# Patient Record
Sex: FEMALE | Race: WHITE | NOT HISPANIC OR LATINO | Employment: OTHER | URBAN - METROPOLITAN AREA
[De-identification: names, ages, dates, MRNs, and addresses within clinical notes are randomized per-mention and may not be internally consistent; named-entity substitution may affect disease eponyms.]

---

## 2020-06-09 ENCOUNTER — AMBULATORY - HEALTHEAST (OUTPATIENT)
Dept: SURGERY | Facility: CLINIC | Age: 75
End: 2020-06-09

## 2020-06-09 DIAGNOSIS — Z11.59 ENCOUNTER FOR SCREENING FOR OTHER VIRAL DISEASES: ICD-10-CM

## 2020-07-07 ENCOUNTER — AMBULATORY - HEALTHEAST (OUTPATIENT)
Dept: MULTI SPECIALTY CLINIC | Facility: CLINIC | Age: 75
End: 2020-07-07

## 2020-07-07 LAB
CREAT SERPL-MCNC: 0.88 MG/DL (ref 0.55–1.02)
GFR ESTIMATE EXT - HISTORICAL: >60 ML/MIN/1.73M2

## 2020-07-13 ENCOUNTER — AMBULATORY - HEALTHEAST (OUTPATIENT)
Dept: SURGERY | Facility: CLINIC | Age: 75
End: 2020-07-13

## 2020-07-13 DIAGNOSIS — Z11.59 ENCOUNTER FOR SCREENING FOR OTHER VIRAL DISEASES: ICD-10-CM

## 2020-08-21 ASSESSMENT — MIFFLIN-ST. JEOR: SCORE: 1513.27

## 2020-08-22 ENCOUNTER — AMBULATORY - HEALTHEAST (OUTPATIENT)
Dept: FAMILY MEDICINE | Facility: CLINIC | Age: 75
End: 2020-08-22

## 2020-08-22 DIAGNOSIS — Z11.59 ENCOUNTER FOR SCREENING FOR OTHER VIRAL DISEASES: ICD-10-CM

## 2020-08-23 LAB
SARS-COV-2 PCR COMMENT: NORMAL
SARS-COV-2 RNA SPEC QL NAA+PROBE: NEGATIVE
SARS-COV-2 VIRUS SPECIMEN SOURCE: NORMAL

## 2020-08-24 ENCOUNTER — COMMUNICATION - HEALTHEAST (OUTPATIENT)
Dept: SCHEDULING | Facility: CLINIC | Age: 75
End: 2020-08-24

## 2020-08-25 ENCOUNTER — ANESTHESIA - HEALTHEAST (OUTPATIENT)
Dept: SURGERY | Facility: CLINIC | Age: 75
End: 2020-08-25

## 2020-08-26 ENCOUNTER — SURGERY - HEALTHEAST (OUTPATIENT)
Dept: SURGERY | Facility: CLINIC | Age: 75
End: 2020-08-26

## 2020-08-26 ASSESSMENT — MIFFLIN-ST. JEOR: SCORE: 1445.69

## 2020-08-27 ASSESSMENT — MIFFLIN-ST. JEOR: SCORE: 1497.86

## 2020-08-28 ASSESSMENT — MIFFLIN-ST. JEOR: SCORE: 1493.77

## 2020-10-13 ENCOUNTER — COMMUNICATION - HEALTHEAST (OUTPATIENT)
Dept: SCHEDULING | Facility: CLINIC | Age: 75
End: 2020-10-13

## 2021-06-04 VITALS — WEIGHT: 229.2 LBS | HEIGHT: 63 IN | BODY MASS INDEX: 40.61 KG/M2

## 2021-06-10 NOTE — ANESTHESIA PREPROCEDURE EVALUATION
Anesthesia Evaluation        Airway   Mallampati: II  Neck ROM: full   Pulmonary - normal exam    breath sounds clear to auscultation  (+) sleep apnea on CPAP, ,                          Cardiovascular   (+) dysrhythmias, ,     Rhythm: regular  Rate: normal,         Neuro/Psych - negative ROS     Endo/Other    (+) arthritis, obesity,      GI/Hepatic/Renal - negative ROS           Dental - normal exam                        Anesthesia Plan  Planned anesthetic: spinal and peripheral nerve block    ASA 3     Anesthetic plan and risks discussed with: patient    Post-op plan: routine recovery

## 2021-06-10 NOTE — ANESTHESIA POSTPROCEDURE EVALUATION
Patient: Makayla Delgado  Procedure(s):  LEFT TOTAL KNEE ARTHROPLASTY (Left)  Anesthesia type: spinal    Patient location: PACU  Last vitals:   Vitals Value Taken Time   /65 8/26/2020  4:20 PM   Temp 36.2  C (97.2  F) 8/26/2020  4:20 PM   Pulse 74 8/26/2020  4:20 PM   Resp 16 8/26/2020  4:20 PM   SpO2 92 % 8/26/2020  4:20 PM     Post vital signs: stable  Level of consciousness: awake and responds to simple questions  Post-anesthesia pain: pain controlled  Post-anesthesia nausea and vomiting: no  Pulmonary: unassisted, return to baseline  Cardiovascular: stable and blood pressure at baseline  Hydration: adequate  Anesthetic events: no    QCDR Measures:  ASA# 11 - Flores-op Cardiac Arrest: ASA11B - Patient did NOT experience unanticipated cardiac arrest  ASA# 12 - Flores-op Mortality Rate: ASA12B - Patient did NOT die  ASA# 13 - PACU Re-Intubation Rate: NA - No ETT / LMA used for case  ASA# 10 - Composite Anes Safety: ASA10A - No serious adverse event    Additional Notes:

## 2021-06-10 NOTE — ANESTHESIA PROCEDURE NOTES
Peripheral Block    Patient location during procedure: pre-op  Start time: 8/26/2020 11:40 AM  End time: 8/26/2020 11:43 AM  post-op analgesia per surgeon order as noted in medical record  Staffing:  Performing  Anesthesiologist: Dimitrois Umana MD  Preanesthetic Checklist  Completed: patient identified, site marked, risks, benefits, and alternatives discussed, timeout performed, consent obtained, airway assessed, oxygen available, suction available, emergency drugs available and hand hygiene performed  Peripheral Block  Block type: saphenous, adductor canal block  Prep: ChloraPrep  Patient position: supine  Patient monitoring: cardiac monitor, continuous pulse oximetry, heart rate and blood pressure  Laterality: left  Injection technique: ultrasound guided    Ultrasound used to visualize needle placement in proximity to nerve being blocked: yes   US used to visualize anesthetic spread  Visualized anatomic structures normal  No Pathological Findings  Permanent ultrasound image captured for medical record  Sterile gel and probe cover used for ultrasound.  Needle  Needle type: Stimuplex   Needle gauge: 20G  Needle length: 6 in  no peripheral nerve catheter placed  Assessment  Injection assessment: no difficulty with injection, negative aspiration for heme, no paresthesia on injection and incremental injection

## 2021-06-10 NOTE — ANESTHESIA PROCEDURE NOTES
Spinal Block    Patient location during procedure: OR  Start time: 8/26/2020 12:09 PM  End time: 8/26/2020 12:11 PM  Reason for block: primary anesthetic    Staffing:  Performing  Anesthesiologist: Dimitrios Umana MD    Preanesthetic Checklist  Completed: patient identified, risks, benefits, and alternatives discussed, timeout performed, consent obtained, airway assessed, oxygen available, suction available, emergency drugs available and hand hygiene performed  Spinal Block  Patient position: sitting  Prep: ChloraPrep and site prepped and draped  Patient monitoring: heart rate, cardiac monitor, continuous pulse ox and blood pressure  Approach: midline  Location: L3-4  Injection technique: single-shot  Needle type: pencil-tip   Needle gauge: 24 G

## 2021-06-10 NOTE — ANESTHESIA CARE TRANSFER NOTE
Last vitals:   Vitals:    08/26/20 1425   BP: 101/55   Pulse: 93   Resp: 28   Temp: 36.5  C (97.7  F)   SpO2: 93%     Patient's level of consciousness is drowsy  Spontaneous respirations: yes  Maintains airway independently: yes  Dentition unchanged: yes  Oropharynx: oropharynx clear of all foreign objects    QCDR Measures:  ASA# 20 - Surgical Safety Checklist: WHO surgical safety checklist completed prior to induction    PQRS# 430 - Adult PONV Prevention: 4558F - Pt received => 2 anti-emetic agents (different classes) preop & intraop  ASA# 8 - Peds PONV Prevention: NA - Not pediatric patient, not GA or 2 or more risk factors NOT present  PQRS# 424 - Flores-op Temp Management: 4559F - At least one body temp DOCUMENTED => 35.5C or 95.9F within required timeframe  PQRS# 426 - PACU Transfer Protocol: - Transfer of care checklist used  ASA# 14 - Acute Post-op Pain: ASA14B - Patient did NOT experience pain >= 7 out of 10

## 2021-06-16 PROBLEM — Z96.652 S/P TOTAL KNEE ARTHROPLASTY, LEFT: Status: ACTIVE | Noted: 2020-08-26

## 2021-07-03 NOTE — ADDENDUM NOTE
Addendum Note by Zoe Palm, RN at 6/9/2020 12:49 PM     Author: Zoe Palm RN Service: -- Author Type: Registered Nurse    Filed: 6/26/2020  8:11 AM Encounter Date: 6/9/2020 Status: Signed    : Zoe Palm RN (Registered Nurse)    Addended by: ZOE PALM on: 6/26/2020 08:11 AM        Modules accepted: Orders

## 2023-06-19 ENCOUNTER — PRE VISIT (OUTPATIENT)
Dept: ONCOLOGY | Facility: CLINIC | Age: 78
End: 2023-06-19

## 2023-06-19 NOTE — TELEPHONE ENCOUNTER
Action    Action Taken 6/19/2023 1:53pm KEB   In-basket message from JOEL Cortes- push 5/2023 PET imaging + all/any CT chest imaging in last 10 years into PACS from [facility].     I called 's IMG Dept Ph: [phone] #4 - they asked me to send over a fax request. I faxed over a STAT request for scans.

## 2023-06-20 DIAGNOSIS — R91.8 PULMONARY NODULES: Primary | ICD-10-CM

## 2023-06-20 NOTE — PROGRESS NOTES
Asked by Dr. Null (pulmonary, [facility]) to review PET scan. 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 There appears to be an airway going to the RLL nodule, but not the LLL nodule. Discussed with Dr. Null that Navigation bronch can be attempted to RLL nodule with EBUS, and possibly LLL nodule as well during same procedure, but she may end up needing CT guided biopsy to the LLL nodule if a path is not found to the nodule. He would like us to proceed with scheduling.     --Plan for navigational bronchoscopy with Ion + EBUS  --Hold apixaban for 2 day (for afib)  --PAC prior to procedure.     Kayli Dougherty MD  Interventional Pulmonary

## 2023-06-29 ENCOUNTER — TELEPHONE (OUTPATIENT)
Dept: PULMONOLOGY | Facility: CLINIC | Age: 78
End: 2023-06-29

## 2023-06-29 NOTE — TELEPHONE ENCOUNTER
Writer attempted to reach patient to schedule IP procedure. Neither number listed in patient chart in service. Unable to leave message.    Glenn Cavazos on 6/29/2023 at 3:35 PM

## 2023-07-03 ENCOUNTER — CARE COORDINATION (OUTPATIENT)
Dept: PULMONOLOGY | Facility: CLINIC | Age: 78
End: 2023-07-03
Payer: COMMERCIAL

## 2023-07-03 NOTE — TELEPHONE ENCOUNTER
Writer attempted to contact patient's son. Left message on unidentified voicemail with callback number [phone].    Glenn Cavazos on 7/3/2023 at 8:36 AM

## 2023-07-03 NOTE — TELEPHONE ENCOUNTER
Patient's son Osvaldo returned call. Scheduled ION procedure for 07/14. PAC scheduled for 07/10. Packet information being sent via email per caller's request.    Glenn Cavazos on 7/3/2023 at 9:18 AM

## 2023-07-03 NOTE — PROGRESS NOTES
RNCC sent surgery packet to son (Dustin) per pt/son request via encrypted e-mail.    E-mail verified and sent to: [email or web address]

## 2023-07-05 NOTE — TELEPHONE ENCOUNTER
FUTURE VISIT INFORMATION      SURGERY INFORMATION:    Date: 7/14/23    Location: uu or    Surgeon:  Kar Soriano MD    Anesthesia Type:  general    Procedure: Robot Assisted Ion BRONCHOSCOPY, WITH BIOPSY endobronchial ultrasound with transbronchial biopsies    RECORDS REQUESTED FROM:       Primary Care Provider: Fidelia Basurto MD  - [facility]    Most recent EKG+ Tracing: 3/23/22- [facility]    Most recent ECHO: 4/27/23- [facility]    Most recent Cardiac Stress Test: 7/8/20- [facility]

## 2023-07-07 NOTE — PHARMACY - PREOPERATIVE ASSESSMENT CENTER
Anticoagulation Note - Preoperative Assessment Center (PAC) Pharmacist     Patient was interviewed on July 7, 2023 prior to scheduled PAC clinic appointment. The purpose of this note is to document the perioperative anticoagulation plan outlined by the providers caring for Makayla Delgado.     Current Regimen  Anticoagulation Regimen as of July 7, 2023: apixaban 5mg by mouth twice daily   Indication: afib  Prescriber:  Dr. Joel  Expected Duration of therapy: Indefinite  Current medications that may interact with this include: None    Creatinine   Date Value Ref Range Status   08/28/2020 0.81 0.60 - 1.10 mg/dL Final       Perioperative plan  Makayla Delgado is scheduled for Robot Assisted Ion BRONCHOSCOPY, WITH BIOPSY on 7/14/23 with Dr. Soriano and the perioperative anticoagulation plan outlined by the procedural team is to hold apixaban 48 hours prior to the procedure. Last dose should be the evening of 7/11/23.      Resumption of anticoagulation after procedure will be based on surgery team assessment of bleeding risks and complications.  This plan may require re-assessment and modification by her primary team in the perioperative setting depending on patients clinical situation.        Alex Kyle RPH  July 7, 2023  12:52 PM

## 2023-07-10 ENCOUNTER — PRE VISIT (OUTPATIENT)
Dept: SURGERY | Facility: CLINIC | Age: 78
End: 2023-07-10

## 2023-07-10 ENCOUNTER — VIRTUAL VISIT (OUTPATIENT)
Dept: SURGERY | Facility: CLINIC | Age: 78
End: 2023-07-10
Payer: COMMERCIAL

## 2023-07-10 ENCOUNTER — ANESTHESIA EVENT (OUTPATIENT)
Dept: SURGERY | Facility: CLINIC | Age: 78
End: 2023-07-10
Payer: COMMERCIAL

## 2023-07-10 DIAGNOSIS — Z01.818 PRE-OP EVALUATION: Primary | ICD-10-CM

## 2023-07-10 PROCEDURE — 99204 OFFICE O/P NEW MOD 45 MIN: CPT | Mod: VID | Performed by: PHYSICIAN ASSISTANT

## 2023-07-10 ASSESSMENT — PAIN SCALES - GENERAL: PAINLEVEL: NO PAIN (0)

## 2023-07-10 ASSESSMENT — LIFESTYLE VARIABLES: TOBACCO_USE: 0

## 2023-07-10 ASSESSMENT — ENCOUNTER SYMPTOMS
DYSRHYTHMIAS: 1
SEIZURES: 0

## 2023-07-10 NOTE — PATIENT INSTRUCTIONS
Preparing for Your Surgery      Name:  Makayla Delgado   MRN:  [id number]   :  1945   Today's Date:  7/10/2023       Arriving for surgery:  Surgery date:  2023  Arrival time:  10:30 am    Please come to:     Please come to:      [facility] [address]  [address] [place]  [address]      The [facility] Patient /Visitor Ramp is located at [address]. Patients and visitors who self-park will receive the reduced hospital parking rate. If the Patient /Visitor Ramp is full, please follow the signs to the [employer] parking located at the main hospital entrance.     parking is available ( 24 hours/ 7 days a week)    Discounted parking pass options are available for patients and visitors. They can be purchased at the [employer] desk at the main hospital entrance.    -    Stop at the security desk and they will direct surgery patients to the 3rd floor Surgery Waiting Room. [phone]     -  If you are in need of directions, wheelchair or escort please stop at the Information/security desk in the lobby.       What can I eat or drink?  -  You may eat and drink normally up to 8 hours prior to arrival time. (Until Midnight)  -  You may have clear liquids until 2 hours prior to arrival time. (Until 8 am)    Examples of clear liquids:  Water  Clear broth  Juices (apple, white grape, white cranberry  and cider) without pulp  Noncarbonated, powder based beverages  (lemonade and [employer])  Sodas (Sprite, 7-Up, ginger ale and seltzer)  Coffee or tea (without milk or cream)  Gatorade    -  No Alcohol or cannabis products for at least 24 hours before surgery.     Which medicines can I take?    Hold Aspirin for 7 days before surgery.   Hold Multivitamins for 7 days before surgery.  Hold Supplements for 7 days before surgery. (Cranberry)  Hold Ibuprofen (Advil, Motrin) for 1 day(s) before surgery--unless otherwise directed by  surgeon.  Hold Naproxen (Aleve) for 4 days before surgery.      Plan for Apixaban---hold for 48 hours before procedure---take last dose the evening of 7/11/23    -  DO NOT take these medications the day of surgery:  Furosemide      -  PLEASE TAKE these medications the day of surgery:  Acetaminophen (Tylenol) if needed  Diltiazem  Metoprolol        How do I prepare myself?  - Please take 2 showers (one the night prior to surgery and one the morning of surgery) using Scrubcare or Hibiclens soap.    Use this soap only from the neck to your toes.     Leave the soap on your skin for one minute--then rinse thoroughly.      You may use your own shampoo and conditioner. No other hair products.   - Please remove all jewelry and body piercings.  - No lotions, deodorants or fragrance.  - No makeup or fingernail polish.   - Bring your ID and insurance card.    -If you have a Deep Brain Stimulator, Spinal Cord Stimulator, or any Neuro Stimulator device---you must bring the remote control to the hospital.      ALL PATIENTS GOING HOME THE SAME DAY OF SURGERY ARE REQUIRED TO HAVE A RESPONSIBLE ADULT TO DRIVE AND BE IN ATTENDANCE WITH THEM FOR 24 HOURS FOLLOWING SURGERY.    Covid testing policy as of 12/06/2022  Your surgeon will notify and schedule you for a COVID test if one is needed before surgery--please direct any questions or COVID symptoms to your surgeon      Questions or Concerns:    - For any questions regarding the day of surgery or your hospital stay, please contact the Pre Admission Nursing Office at [phone].       - If you have health changes between today and your surgery, please call your surgeon.       - For questions after surgery, please call your surgeons office.           Current Visitor Guidelines    You may have 2 visitors in the pre op area.    Visiting hours: 8 a.m. to 8:30 p.m.    You may have four visitors during your inpatient hospital stay.    Patients confirmed or suspected to have symptoms of  COVID 19 or flu:     No visitors allowed for adult patients.   Children (under age 18) can have 1 named visitor.     People who are sick or showing symptoms of COVID 19 or flu:    Are not allowed to visit patients--we can only make exceptions in special situations.       Please follow these guidelines for your visit:          Please maintain social distance          Masking is optional--however at times you may be asked to wear a mask for the safety of yourself and others     Clean your hands with alcohol hand . Do this when you arrive at and leave the building and patient room,    And again after you touch your mask or anything in the room.     Go directly to and from the room you are visiting.     Stay in the patient s room during your visit. Limit going to other places in the hospital as much as possible     Leave bags and jackets at home or in the car.     For everyone s health, please don t come and go during your visit. That includes for smoking   during your visit.

## 2023-07-10 NOTE — H&P
Pre-Operative H & P     CC:  Preoperative exam to assess for increased cardiopulmonary risk while undergoing surgery and anesthesia.    Date of Encounter: 7/10/2023  Primary Care Physician:  Mckenzie Gan     Reason for visit:   Encounter Diagnosis   Name Primary?     Pre-op evaluation Yes       HPI  Makayla Delgado is a 78 year old female who presents for pre-operative H & P in preparation for  Procedure Information     Case: [id number] Date/Time: 07/14/23 1415    Procedures:       Robot Assisted Ion BRONCHOSCOPY, WITH BIOPSY (Bronchus)      endobronchial ultrasound with transbronchial biopsies (Bronchus)    Anesthesia type: General    Diagnosis: Pulmonary nodules [R91.8]    Pre-op diagnosis: Pulmonary nodules [R91.8]    Location:  OR [address] [place] [address]    Providers: Kar Soriano MD          Patient is being evaluated for comorbid conditions of atrial fibrillation, ALEK, arthritis    Ms. Delgado has 2 growing lung nodules. She was referred to Dr. Dougherty for further evaluation. She is now scheduled for biopsy as above.     History is obtained from the patient and chart review    Hx of abnormal bleeding or anti-platelet use: Eliquis    Menstrual history: No LMP recorded. Patient has had a hysterectomy.     Past Medical History  Past Medical History:   Diagnosis Date     Anemia      Arthritis      Atrial fibrillation (H)      Sleep apnea        Past Surgical History  Past Surgical History:   Procedure Laterality Date     GASTRIC BYPASS       HYSTERECTOMY       ZZC TOTAL KNEE ARTHROPLASTY Left 8/26/2020    Procedure: LEFT TOTAL KNEE ARTHROPLASTY;  Surgeon: Santo Benson MD;  Location: [facility];  Service: Orthopedics       Prior to Admission Medications  Current Outpatient Medications   Medication Sig Dispense Refill     apixaban ANTICOAGULANT (ELIQUIS) 5 mg Tab tablet [APIXABAN ANTICOAGULANT (ELIQUIS) 5 MG TAB TABLET] Take 5 mg by mouth 2 (two) times a day. Last dose will be Saturday, 8/22 pm dose        diltiazem (CARDIZEM CD) 240 MG 24 hr capsule Take 240 mg by mouth every morning       furosemide (LASIX) 20 MG tablet Take 20 mg by mouth every morning       metoprolol succinate (TOPROL-XL) 200 MG 24 hr tablet Take 200 mg by mouth every morning       pediatric multivitamin-iron (POLY-VI-SOL WITH IRON) chewable tablet [PEDIATRIC MULTIVITAMIN-IRON (POLY-VI-SOL WITH IRON) CHEWABLE TABLET] Chew 1 tablet 2 (two) times a day.       rosuvastatin (CRESTOR) 20 MG tablet [ROSUVASTATIN (CRESTOR) 20 MG TABLET] Take 20 mg by mouth at bedtime.       acetaminophen (TYLENOL) 500 MG tablet [ACETAMINOPHEN (TYLENOL) 500 MG TABLET] Take 2 tablets (1,000 mg total) by mouth 3 (three) times a day. (Patient not taking: Reported on 7/10/2023)  0     cranberry fruit extract (CRANBERRY EXTRACT ORAL) [CRANBERRY FRUIT EXTRACT (CRANBERRY EXTRACT ORAL)] Take by mouth daily. (Patient not taking: Reported on 7/10/2023)         Allergies  Allergies   Allergen Reactions     Codeine Itching       Social History  Social History     Socioeconomic History     Marital status:      Spouse name: Not on file     Number of children: Not on file     Years of education: Not on file     Highest education level: Not on file   Occupational History     Not on file   Tobacco Use     Smoking status: Never     Smokeless tobacco: Never   Substance and Sexual Activity     Alcohol use: Not Currently     Drug use: Never     Sexual activity: Not on file   Other Topics Concern     Not on file   Social History Narrative     Not on file     Social Determinants of Health     Financial Resource Strain: Not on file   Food Insecurity: Not on file   Transportation Needs: Not on file   Physical Activity: Not on file   Stress: Not on file   Social Connections: Not on file   Intimate Partner Violence: Not on file   Housing Stability: Not on file       Family History  Family History   Problem Relation Age of Onset     Anesthesia Reaction No family hx of      Deep Vein  Thrombosis (DVT) No family hx of        Review of Systems  The complete review of systems is negative other than noted in the HPI or here.   Anesthesia Evaluation   Pt has had prior anesthetic.         ROS/MED HX  ENT/Pulmonary:     (+) sleep apnea, doesn't use CPAP,  (-) tobacco use   Neurologic:  - neg neurologic ROS  (-) no seizures and no CVA   Cardiovascular:     (+) hypertension-----Taking blood thinners dysrhythmias, a-fib, Previous cardiac testing   Echo: Date: 4/2023 Results:  Summary     1. Normal left ventricular chamber size, wall thickness, and systolic   function with a     2. visually estimated LVEF of 55-60%. No regional wall motion   abnormalities.   Indeterminate diastolic function.     3. Mild right ventricular enlargement with low normal systolic function.     4. Mild mitral valve regurgitation.     5. There is moderate tricuspid valve regurgitation.     6. The estimated pulmonary artery systolic pressure is 56 mmHg.     7. The IVC measures >2.1 cm with >50% collapse upon inspiration   consistent   with elevated right atrial pressure, 8 mmHg.     8. Atrial fibrillation during study.     9. A prior echocardiogram on 11/15/21 reported low normal left   ventricular   systolic function with an LVEF 50 55%, mild right ventricular enlargement   low-normal systolic function, mild mitral regurgitation, mild tricuspid   regurgitation, and a PASP of 33 mmHg.     Stress Test: Date: Results:    ECG Reviewed: Date: 8/2020 Results:  Atrial fibrillation with RVR  Cath: Date: Results:      METS/Exercise Tolerance: 4 - Raking leaves, gardening Comment: Walking a block or two before stopping. Reports she can ascend a flight or stairs without issue. Denies CP or OCAMPO   Hematologic:  - neg hematologic  ROS  (-) history of blood clots and history of blood transfusion   Musculoskeletal:  - neg musculoskeletal ROS     GI/Hepatic:  - neg GI/hepatic ROS  (-) GERD   Renal/Genitourinary:  - neg Renal ROS     Endo:     (+)  type II DM, Last HgA1c: 6.6, date: 12/2022, Not using insulin,  (-) chronic steroid usage   Psychiatric/Substance Use:  - neg psychiatric ROS  (-) psychiatric history   Infectious Disease:  - neg infectious disease ROS     Malignancy:       Other:            Virtual visit -  No vitals were obtained    Physical Exam  Constitutional: Awake, alert, cooperative, no apparent distress, and appears stated age.  HENT: Normocephalic  Respiratory: non labored breathing   Neurologic: Awake, alert, oriented to name, place and time.   Neuropsychiatric: Calm, cooperative. Normal affect.      Prior Labs/Diagnostic Studies   All labs and imaging personally reviewed     EKG/ stress test - if available please see in ROS above   No results found.       No data to display                  The patient's records and results personally reviewed by this provider.     Outside records reviewed from: Care Everywhere      Assessment      Makayla Delgado is a 78 year old female seen as a PAC referral for risk assessment and optimization for anesthesia.    Plan/Recommendations  Pt will be optimized for the proposed procedure.  See below for details on the assessment, risk, and preoperative recommendations    NEUROLOGY  - No history of TIA, CVA or seizure  -Post Op delirium risk factors:  No risk identified    ENT  - No current airway concerns.  Will need to be reassessed day of surgery.  Mallampati: Unable to assess  TM: Unable to assess    CARDIAC  - No history of CAD   -hypertension, atrial fibrillation. Will hold eliquis x2 days prior to surgery   -follows with cardiology. Last seen 12/2022 and stable with recommendation to follow up in 1 year.   -previous cardiac testing above   - METS (Metabolic Equivalents)  Patient performs 4 or more METS exercise without symptoms            Total Score: 0      RCRI-Very low risk: Class 1 0.4% complication rate            Total Score: 0        PULMONARY  - Obstructive Sleep Apnea  ALEK without home  "CPAP use.  - Denies asthma or inhaler use  - Tobacco History      History   Smoking Status     Never   Smokeless Tobacco     Never       GI  PONV High Risk  Total Score: 3           1 AN PONV: Pt is Female    1 AN PONV: Patient is not a current smoker    1 AN PONV: Intended Post Op Opioids        /RENAL  - Baseline Creatinine WNL    ENDOCRINE    - BMI: Estimated body mass index is 40.6 kg/m  as calculated from the following:    Height as of 8/26/20: 1.6 m (5' 3\").    Weight as of 8/28/20: 104 kg (229 lb 3.2 oz).  Class 3 Obesity (BMI > 40)  A1c 6.6    HEME  VTE Low Risk 0.26%            Total Score: 1    VTE: Greater than 59 yrs old      - Coagulopathy second to Apixaban (Eliquis) will hold x2 days     Different anesthesia methods/types have been discussed with the patient, but they are aware that the final plan will be decided by the assigned anesthesia provider on the date of service.    The patient is optimized for their procedure. AVS with information on surgery time/arrival time, meds and NPO status given by nursing staff. No further diagnostic testing indicated.    Please refer to the physical examination documented by the anesthesiologist in the anesthesia record on the day of surgery.    Video-Visit Details    Type of service:  Video Visit    Provider received verbal consent for a Video Visit from the patient? Yes   Video Start Time: 1632  Video End Time:1641    Originating Location (pt. Location): Home    Distant Location (provider location):  Off-site  Mode of Communication:  Video Conference via [employer]  On the day of service:     Prep time: 10 minutes  Visit time: 9 minutes  Documentation time: 8 minutes  ------------------------------------------  Total time: 27 minutes      Yecenia Orozco PA-C  Preoperative Assessment Center  [facility]  Clinic and Surgery Center  Phone: [phone]  Fax: [phone]  "

## 2023-07-10 NOTE — PROGRESS NOTES
Makayla is a 78 year old who is being evaluated via a billable video visit.      How would you like to obtain your AVS? MyChart  If the video visit is dropped, the invitation should be resent by: Text to cell phone: [phone]          HPI                 Review of Systems               Physical Exam

## 2023-07-14 ENCOUNTER — ANESTHESIA (OUTPATIENT)
Dept: SURGERY | Facility: CLINIC | Age: 78
End: 2023-07-14
Payer: COMMERCIAL

## 2023-07-14 ENCOUNTER — APPOINTMENT (OUTPATIENT)
Dept: GENERAL RADIOLOGY | Facility: CLINIC | Age: 78
End: 2023-07-14
Attending: INTERNAL MEDICINE
Payer: COMMERCIAL

## 2023-07-14 ENCOUNTER — HOSPITAL ENCOUNTER (OUTPATIENT)
Dept: CT IMAGING | Facility: CLINIC | Age: 78
Discharge: HOME | End: 2023-07-14
Attending: STUDENT IN AN ORGANIZED HEALTH CARE EDUCATION/TRAINING PROGRAM
Payer: COMMERCIAL

## 2023-07-14 ENCOUNTER — HOSPITAL ENCOUNTER (OUTPATIENT)
Facility: CLINIC | Age: 78
Discharge: HOME | End: 2023-07-14
Attending: INTERNAL MEDICINE | Admitting: INTERNAL MEDICINE
Payer: COMMERCIAL

## 2023-07-14 VITALS
WEIGHT: 205.91 LBS | HEIGHT: 65 IN | RESPIRATION RATE: 16 BRPM | SYSTOLIC BLOOD PRESSURE: 108 MMHG | TEMPERATURE: 97.7 F | OXYGEN SATURATION: 95 % | BODY MASS INDEX: 34.31 KG/M2 | DIASTOLIC BLOOD PRESSURE: 69 MMHG | HEART RATE: 76 BPM

## 2023-07-14 DIAGNOSIS — R91.8 PULMONARY NODULES: ICD-10-CM

## 2023-07-14 PROCEDURE — 88305 TISSUE EXAM BY PATHOLOGIST: CPT | Mod: 26 | Performed by: PATHOLOGY

## 2023-07-14 PROCEDURE — 999N000065 XR CHEST PORT 1 VIEW

## 2023-07-14 PROCEDURE — 999N000181 XR SURGERY CARM FLUORO GREATER THAN 5 MIN W STILLS: Mod: TC

## 2023-07-14 PROCEDURE — 88108 CYTOPATH CONCENTRATE TECH: CPT | Mod: 26 | Performed by: PATHOLOGY

## 2023-07-14 PROCEDURE — 31627 NAVIGATIONAL BRONCHOSCOPY: CPT | Mod: GC | Performed by: INTERNAL MEDICINE

## 2023-07-14 PROCEDURE — 88173 CYTOPATH EVAL FNA REPORT: CPT | Mod: 26 | Performed by: PATHOLOGY

## 2023-07-14 PROCEDURE — 250N000011 HC RX IP 250 OP 636: Mod: JZ | Performed by: NURSE ANESTHETIST, CERTIFIED REGISTERED

## 2023-07-14 PROCEDURE — 88305 TISSUE EXAM BY PATHOLOGIST: CPT | Mod: TC | Performed by: INTERNAL MEDICINE

## 2023-07-14 PROCEDURE — 272N000001 HC OR GENERAL SUPPLY STERILE: Performed by: INTERNAL MEDICINE

## 2023-07-14 PROCEDURE — 71045 X-RAY EXAM CHEST 1 VIEW: CPT | Mod: 26 | Performed by: RADIOLOGY

## 2023-07-14 PROCEDURE — 250N000009 HC RX 250: Performed by: NURSE ANESTHETIST, CERTIFIED REGISTERED

## 2023-07-14 PROCEDURE — 360N000084 HC SURGERY LEVEL 4 W/ FLUORO, PER MIN: Performed by: INTERNAL MEDICINE

## 2023-07-14 PROCEDURE — 31652 BRONCH EBUS SAMPLNG 1/2 NODE: CPT | Mod: GC | Performed by: INTERNAL MEDICINE

## 2023-07-14 PROCEDURE — 31624 DX BRONCHOSCOPE/LAVAGE: CPT | Performed by: INTERNAL MEDICINE

## 2023-07-14 PROCEDURE — 31629 BRONCHOSCOPY/NEEDLE BX EACH: CPT | Mod: GC | Performed by: INTERNAL MEDICINE

## 2023-07-14 PROCEDURE — 710N000010 HC RECOVERY PHASE 1, LEVEL 2, PER MIN: Performed by: INTERNAL MEDICINE

## 2023-07-14 PROCEDURE — 71250 CT THORAX DX C-: CPT | Mod: 26 | Performed by: RADIOLOGY

## 2023-07-14 PROCEDURE — 258N000003 HC RX IP 258 OP 636: Performed by: NURSE ANESTHETIST, CERTIFIED REGISTERED

## 2023-07-14 PROCEDURE — 370N000017 HC ANESTHESIA TECHNICAL FEE, PER MIN: Performed by: INTERNAL MEDICINE

## 2023-07-14 PROCEDURE — 999N000141 HC STATISTIC PRE-PROCEDURE NURSING ASSESSMENT: Performed by: INTERNAL MEDICINE

## 2023-07-14 PROCEDURE — 88172 CYTP DX EVAL FNA 1ST EA SITE: CPT | Mod: 26 | Performed by: PATHOLOGY

## 2023-07-14 PROCEDURE — 31654 BRONCH EBUS IVNTJ PERPH LES: CPT | Mod: GC | Performed by: INTERNAL MEDICINE

## 2023-07-14 PROCEDURE — 71250 CT THORAX DX C-: CPT

## 2023-07-14 PROCEDURE — 710N000012 HC RECOVERY PHASE 2, PER MINUTE: Performed by: INTERNAL MEDICINE

## 2023-07-14 RX ORDER — ONDANSETRON 2 MG/ML
INJECTION INTRAMUSCULAR; INTRAVENOUS PRN
Status: DISCONTINUED | OUTPATIENT
Start: 2023-07-14 | End: 2023-07-14

## 2023-07-14 RX ORDER — HYDROMORPHONE HCL IN WATER/PF 6 MG/30 ML
0.2 PATIENT CONTROLLED ANALGESIA SYRINGE INTRAVENOUS EVERY 5 MIN PRN
Status: DISCONTINUED | OUTPATIENT
Start: 2023-07-14 | End: 2023-07-14 | Stop reason: HOSPADM

## 2023-07-14 RX ORDER — LIDOCAINE HYDROCHLORIDE 20 MG/ML
INJECTION, SOLUTION INFILTRATION; PERINEURAL PRN
Status: DISCONTINUED | OUTPATIENT
Start: 2023-07-14 | End: 2023-07-14

## 2023-07-14 RX ORDER — ONDANSETRON 2 MG/ML
4 INJECTION INTRAMUSCULAR; INTRAVENOUS EVERY 30 MIN PRN
Status: CANCELLED | OUTPATIENT
Start: 2023-07-14

## 2023-07-14 RX ORDER — OXYCODONE HYDROCHLORIDE 5 MG/1
5 TABLET ORAL
Status: CANCELLED | OUTPATIENT
Start: 2023-07-14

## 2023-07-14 RX ORDER — ONDANSETRON 2 MG/ML
4 INJECTION INTRAMUSCULAR; INTRAVENOUS EVERY 30 MIN PRN
Status: DISCONTINUED | OUTPATIENT
Start: 2023-07-14 | End: 2023-07-14 | Stop reason: HOSPADM

## 2023-07-14 RX ORDER — FENTANYL CITRATE 50 UG/ML
50 INJECTION, SOLUTION INTRAMUSCULAR; INTRAVENOUS EVERY 5 MIN PRN
Status: DISCONTINUED | OUTPATIENT
Start: 2023-07-14 | End: 2023-07-14 | Stop reason: HOSPADM

## 2023-07-14 RX ORDER — FENTANYL CITRATE 50 UG/ML
INJECTION, SOLUTION INTRAMUSCULAR; INTRAVENOUS PRN
Status: DISCONTINUED | OUTPATIENT
Start: 2023-07-14 | End: 2023-07-14

## 2023-07-14 RX ORDER — HYDROMORPHONE HCL IN WATER/PF 6 MG/30 ML
0.4 PATIENT CONTROLLED ANALGESIA SYRINGE INTRAVENOUS EVERY 5 MIN PRN
Status: DISCONTINUED | OUTPATIENT
Start: 2023-07-14 | End: 2023-07-14 | Stop reason: HOSPADM

## 2023-07-14 RX ORDER — PROPOFOL 10 MG/ML
INJECTION, EMULSION INTRAVENOUS CONTINUOUS PRN
Status: DISCONTINUED | OUTPATIENT
Start: 2023-07-14 | End: 2023-07-14

## 2023-07-14 RX ORDER — OXYCODONE HYDROCHLORIDE 10 MG/1
10 TABLET ORAL
Status: CANCELLED | OUTPATIENT
Start: 2023-07-14

## 2023-07-14 RX ORDER — DEXAMETHASONE SODIUM PHOSPHATE 4 MG/ML
INJECTION, SOLUTION INTRA-ARTICULAR; INTRALESIONAL; INTRAMUSCULAR; INTRAVENOUS; SOFT TISSUE PRN
Status: DISCONTINUED | OUTPATIENT
Start: 2023-07-14 | End: 2023-07-14

## 2023-07-14 RX ORDER — PROPOFOL 10 MG/ML
INJECTION, EMULSION INTRAVENOUS PRN
Status: DISCONTINUED | OUTPATIENT
Start: 2023-07-14 | End: 2023-07-14

## 2023-07-14 RX ORDER — FENTANYL CITRATE 50 UG/ML
25 INJECTION, SOLUTION INTRAMUSCULAR; INTRAVENOUS EVERY 5 MIN PRN
Status: DISCONTINUED | OUTPATIENT
Start: 2023-07-14 | End: 2023-07-14 | Stop reason: HOSPADM

## 2023-07-14 RX ORDER — ONDANSETRON 4 MG/1
4 TABLET, ORALLY DISINTEGRATING ORAL EVERY 30 MIN PRN
Status: DISCONTINUED | OUTPATIENT
Start: 2023-07-14 | End: 2023-07-14 | Stop reason: HOSPADM

## 2023-07-14 RX ORDER — SODIUM CHLORIDE, SODIUM LACTATE, POTASSIUM CHLORIDE, CALCIUM CHLORIDE 600; 310; 30; 20 MG/100ML; MG/100ML; MG/100ML; MG/100ML
INJECTION, SOLUTION INTRAVENOUS CONTINUOUS PRN
Status: DISCONTINUED | OUTPATIENT
Start: 2023-07-14 | End: 2023-07-14

## 2023-07-14 RX ORDER — SODIUM CHLORIDE, SODIUM LACTATE, POTASSIUM CHLORIDE, CALCIUM CHLORIDE 600; 310; 30; 20 MG/100ML; MG/100ML; MG/100ML; MG/100ML
INJECTION, SOLUTION INTRAVENOUS CONTINUOUS
Status: DISCONTINUED | OUTPATIENT
Start: 2023-07-14 | End: 2023-07-14 | Stop reason: HOSPADM

## 2023-07-14 RX ORDER — ONDANSETRON 4 MG/1
4 TABLET, ORALLY DISINTEGRATING ORAL EVERY 30 MIN PRN
Status: CANCELLED | OUTPATIENT
Start: 2023-07-14

## 2023-07-14 RX ADMIN — PROPOFOL 120 MG: 10 INJECTION, EMULSION INTRAVENOUS at 14:25

## 2023-07-14 RX ADMIN — LIDOCAINE HYDROCHLORIDE 60 MG: 20 INJECTION, SOLUTION INFILTRATION; PERINEURAL at 14:25

## 2023-07-14 RX ADMIN — FENTANYL CITRATE 25 MCG: 50 INJECTION, SOLUTION INTRAMUSCULAR; INTRAVENOUS at 14:25

## 2023-07-14 RX ADMIN — Medication 20 MG: at 14:58

## 2023-07-14 RX ADMIN — PROPOFOL 20 MG: 10 INJECTION, EMULSION INTRAVENOUS at 15:26

## 2023-07-14 RX ADMIN — PROPOFOL 125 MCG/KG/MIN: 10 INJECTION, EMULSION INTRAVENOUS at 14:30

## 2023-07-14 RX ADMIN — SODIUM CHLORIDE, POTASSIUM CHLORIDE, SODIUM LACTATE AND CALCIUM CHLORIDE: 600; 310; 30; 20 INJECTION, SOLUTION INTRAVENOUS at 14:15

## 2023-07-14 RX ADMIN — Medication 50 MG: at 14:25

## 2023-07-14 RX ADMIN — PHENYLEPHRINE HYDROCHLORIDE 100 MCG: 10 INJECTION INTRAVENOUS at 14:45

## 2023-07-14 RX ADMIN — SUGAMMADEX 200 MG: 100 INJECTION, SOLUTION INTRAVENOUS at 15:43

## 2023-07-14 RX ADMIN — PHENYLEPHRINE HYDROCHLORIDE 100 MCG: 10 INJECTION INTRAVENOUS at 14:59

## 2023-07-14 RX ADMIN — ONDANSETRON 4 MG: 2 INJECTION INTRAMUSCULAR; INTRAVENOUS at 15:34

## 2023-07-14 RX ADMIN — DEXAMETHASONE SODIUM PHOSPHATE 4 MG: 4 INJECTION, SOLUTION INTRA-ARTICULAR; INTRALESIONAL; INTRAMUSCULAR; INTRAVENOUS; SOFT TISSUE at 14:45

## 2023-07-14 ASSESSMENT — ACTIVITIES OF DAILY LIVING (ADL)
ADLS_ACUITY_SCORE: 37
ADLS_ACUITY_SCORE: 33
ADLS_ACUITY_SCORE: 37
ADLS_ACUITY_SCORE: 37

## 2023-07-14 ASSESSMENT — ENCOUNTER SYMPTOMS
SEIZURES: 0
DYSRHYTHMIAS: 1

## 2023-07-14 ASSESSMENT — LIFESTYLE VARIABLES: TOBACCO_USE: 0

## 2023-07-14 NOTE — ANESTHESIA CARE TRANSFER NOTE
Patient: Makayla Delgado    Procedure: Procedure(s):  Robot Assisted Ion BRONCHOSCOPY, WITH BIOPSY  endobronchial ultrasound with transbronchial biopsies       Diagnosis: Pulmonary nodules [R91.8]  Diagnosis Additional Information: No value filed.    Anesthesia Type:   General     Note:    Oropharynx: oropharynx clear of all foreign objects and spontaneously breathing  Level of Consciousness: awake  Oxygen Supplementation: nasal cannula  Level of Supplemental Oxygen (L/min / FiO2): 4  Independent Airway: airway patency satisfactory and stable  Dentition: dentition unchanged  Vital Signs Stable: post-procedure vital signs reviewed and stable  Report to RN Given: handoff report given  Patient transferred to: PACU  Comments: Awake, VSS. Tolerated anesthesia well  Handoff Report: Identifed the Patient, Identified the Reponsible Provider, Reviewed the pertinent medical history, Discussed the surgical course, Reviewed Intra-OP anesthesia mangement and issues during anesthesia, Set expectations for post-procedure period and Allowed opportunity for questions and acknowledgement of understanding      Vitals:  Vitals Value Taken Time   BP     Temp     Pulse 56 07/14/23 1559   Resp 10 07/14/23 1559   SpO2 91 % 07/14/23 1559   Vitals shown include unvalidated device data.    Electronically Signed By: ISABELLA Coley CRNA  July 14, 2023  4:00 PM

## 2023-07-14 NOTE — ANESTHESIA PREPROCEDURE EVALUATION
Anesthesia Pre-Procedure Evaluation    Patient: Makayla Delgado   MRN: [id number] : 1945        Procedure : Procedure(s):  Robot Assisted Ion BRONCHOSCOPY, WITH BIOPSY  endobronchial ultrasound with transbronchial biopsies          Past Medical History:   Diagnosis Date     Anemia      Arthritis      Atrial fibrillation (H)      Sleep apnea       Past Surgical History:   Procedure Laterality Date     GASTRIC BYPASS       HYSTERECTOMY       ZZC TOTAL KNEE ARTHROPLASTY Left 2020    Procedure: LEFT TOTAL KNEE ARTHROPLASTY;  Surgeon: Santo Benson MD;  Location: [facility];  Service: Orthopedics      Allergies   Allergen Reactions     Codeine Itching      Social History     Tobacco Use     Smoking status: Never     Smokeless tobacco: Never   Substance Use Topics     Alcohol use: Not Currently      Wt Readings from Last 1 Encounters:   23 93.4 kg (205 lb 14.6 oz)        Anesthesia Evaluation   Pt has had prior anesthetic.         ROS/MED HX  ENT/Pulmonary:     (+) sleep apnea, doesn't use CPAP,  (-) tobacco use   Neurologic:  - neg neurologic ROS  (-) no seizures and no CVA   Cardiovascular:     (+) hypertension-----Taking blood thinners dysrhythmias, a-fib, Previous cardiac testing   Echo: Date: 2023 Results:  Summary     1. Normal left ventricular chamber size, wall thickness, and systolic   function with a     2. visually estimated LVEF of 55-60%. No regional wall motion   abnormalities.   Indeterminate diastolic function.     3. Mild right ventricular enlargement with low normal systolic function.     4. Mild mitral valve regurgitation.     5. There is moderate tricuspid valve regurgitation.     6. The estimated pulmonary artery systolic pressure is 56 mmHg.     7. The IVC measures >2.1 cm with >50% collapse upon inspiration   consistent   with elevated right atrial pressure, 8 mmHg.     8. Atrial fibrillation during study.     9. A prior echocardiogram on 11/15/21 reported low  normal left   ventricular   systolic function with an LVEF 50 55%, mild right ventricular enlargement   low-normal systolic function, mild mitral regurgitation, mild tricuspid   regurgitation, and a PASP of 33 mmHg.     Stress Test: Date: Results:    ECG Reviewed: Date: 8/2020 Results:  Atrial fibrillation with RVR  Cath: Date: Results:      METS/Exercise Tolerance: 4 - Raking leaves, gardening Comment: Walking a block or two before stopping. Reports she can ascend a flight or stairs without issue. Denies CP or OCAMPO   Hematologic:  - neg hematologic  ROS  (-) history of blood clots and history of blood transfusion   Musculoskeletal:  - neg musculoskeletal ROS     GI/Hepatic:  - neg GI/hepatic ROS  (-) GERD   Renal/Genitourinary:  - neg Renal ROS     Endo:     (+) type II DM, Last HgA1c: 6.6, date: 12/2022, Not using insulin,  (-) chronic steroid usage   Psychiatric/Substance Use:  - neg psychiatric ROS  (-) psychiatric history   Infectious Disease:  - neg infectious disease ROS     Malignancy:       Other:            Physical Exam    Airway        Mallampati: II   TM distance: > 3 FB   Neck ROM: full   Mouth opening: > 3 cm    Respiratory Devices and Support         Dental       (+) Modest Abnormalities - crowns, retainers, 1 or 2 missing teeth      Cardiovascular   cardiovascular exam normal          Pulmonary   pulmonary exam normal                OUTSIDE LABS:  CBC:   Lab Results   Component Value Date    HGB 11.6 (L) 08/28/2020    HGB 12.4 08/27/2020     BMP:   Lab Results   Component Value Date     08/28/2020    POTASSIUM 4.6 08/28/2020    POTASSIUM 4.7 08/27/2020    CHLORIDE 104 08/28/2020    CO2 31 08/28/2020    BUN 20 08/28/2020    CR 0.81 08/28/2020    CR 0.88 07/07/2020     (H) 08/28/2020     08/28/2020     COAGS:   Lab Results   Component Value Date    INR 1.06 08/26/2020     POC: No results found for: BGM, HCG, HCGS  HEPATIC: No results found for: ALBUMIN, PROTTOTAL, ALT, AST, GGT,  ALKPHOS, BILITOTAL, BILIDIRECT, ROBBY  OTHER:   Lab Results   Component Value Date    ANTHONY 9.9 08/28/2020       Anesthesia Plan    ASA Status:  3   NPO Status:  NPO Appropriate    Anesthesia Type: General.     - Airway: ETT   Induction: Intravenous.   Maintenance: Balanced.        Consents    Anesthesia Plan(s) and associated risks, benefits, and realistic alternatives discussed. Questions answered and patient/representative(s) expressed understanding.     - Discussed: Risks, Benefits and Alternatives for BOTH SEDATION and the PROCEDURE were discussed     - Discussed with:  Patient      - Extended Intubation/Ventilatory Support Discussed: No.      - Patient is DNR/DNI Status: No    Use of blood products discussed: No .     Postoperative Care    Pain management: IV analgesics.   PONV prophylaxis: Ondansetron (or other 5HT-3), Dexamethasone or Solumedrol     Comments:                Bolivar Cardoza MD

## 2023-07-14 NOTE — DISCHARGE INSTRUCTIONS
Post Bronchoscopy Patient Instructions:    July 14, 2023  Makayla Patelhomar    Your procedure completed (bronchoscopy with left lung biopsy and lymph node biopsy) without any immediate complications.  You may cough up scant amount of blood for the next 12-24 hours. If you have excessive cough with blood, chest pain, shortness of breath, please report to the closest emergency room.    You may experience low grade (less than 100.5 F) fever next 24 hours, if so, you can take Tylenol. If the fever persists more than 24 hours, please contact to our office or your primary care provider.    Our office (Thoracic/Pulmonary--[phone]) will call you with the results of samples taken during the procedure. Please note that you may get a result notification through  My Chart  before us calling you, as the Laboratories are instructed to release the results as soon as they are available to the patients and providers at the same time. Please allow your us 24-48 hours call you to discuss the results.    You may resume your diet as it was prior to procedure.    You may resume your medications after the procedure unless you are instructed to do differently. It is ok to resume apixaban starting 07/15/2023.     Please follow instructions from the nursing staff upon discharge in terms of activity. In general, you should avoid any attention or motor skill requiring activities (e.g., driving or operating any motorized vehicle) for 24 hours as you might be still under the effect of sedation medications. Please make sure an adult to accompany you next 24 hours.     Should you have any question, please do not hesitate to call our office.    Anne-Marie Lopez MD     [facility], [place]  Same-Day BRONCHOSCOPY Procedure (with or without biopsy and/or intervention)  Adult Discharge Orders & Instructions     You had a procedure known as an Bronchoscopy (Bronch). Your healthcare provider performed the Bronch to  look directly into the airways of your trachea and/or lungs. This is done using a lighted camera called a bronchoscope. The bronchoscope allows your provider to see inside the tissue of the airways. Often biopsies, small samples of tissue, are taken to help diagnose and/or classify stages of disease growth. This procedure is used to diagnose diseases of the lungs and/or surrounding tissues.     For 24 hours after BRONCHOSCOPY     You may cough up a small amount of blood for a day or two  Get plenty of rest.  A responsible adult must stay with you for at least 24 hours after you leave the hospital.   Do not drive or use heavy equipment.  If you have weakness or tingling, don't drive or use heavy equipment until this feeling goes away.  Do not drink alcohol.  Avoid strenuous or risky activities (gym, yoga, cycling, etc.).  Ask for help when climbing stairs.   You may feel lightheaded.  IF so, sit for a few minutes before standing.  Have someone help you get up.   If you have nausea (feel sick to your stomach): Drink only clear liquids such as apple juice, ginger ale, broth or 7-Up.  Rest may also help.  Be sure to drink enough fluids.  Move to a regular diet as you feel able.  You may have a slight fever. Call the doctor if your fever is over 100 F (37.7 C) (taken under the tongue) or lasts longer than 24 hours.  You may have a dry mouth, a sore throat, muscle aches or trouble sleeping.  These are normal and should go away after 24 hours.  Do not make important or legal decisions.     Call your doctor  for any of the following:    If you begin to cough up bright red blood, or large clots of blood   If you become increasing more short of breath or begin to have chest pains  Difficulty swallowing or feeling as though food or liquids are getting stuck   Sore throat lasting more than 2 days or pain that has worsened over time  Nausea and/or vomiting that is not resolving or has not responded to anti-nausea  medications if prescribed to you   If you experience a fever above 100.5 F (38 C) for more than 24 hours.   It has been over 8 to 10 hours since surgery and you are still not able to urinate (pass water).      To contact a doctor, call:  Dr Soriano [facility] @ [phone] Monday thru Friday 8:00am to 4:30pm)  [ ] [phone] and ask for the PULMONARY MEDICINE resident on call (answered 24 hours a day)  [ ] Emergency Department: [facility]: [phone]    Take it easy when you get home.  Remember, same day surgery DOES NOT MEAN SAME DAY RECOVERY!  Healing is a gradual process.  You will need some time to recover - you may be more tired than you realize at first.  Rest and relax for at least the first 24 hours at home.  You'll feel better and heal faster if you take good care of yourself.

## 2023-07-14 NOTE — ANESTHESIA PROCEDURE NOTES
Airway       Patient location during procedure: OR       Procedure Start/Stop Times: 7/14/2023 2:28 PM  Staff -        CRNA: Jony Degroot APRN CRNA       Performed By: CRNA  Consent for Airway        Urgency: elective  Indications and Patient Condition       Indications for airway management: james-procedural       Induction type:intravenous       Mask difficulty assessment: 1 - vent by mask    Final Airway Details       Final airway type: endotracheal airway       Successful airway: ETT - single  Endotracheal Airway Details        ETT size (mm): 8.5       Cuffed: yes       Cuff volume (mL): 7       Successful intubation technique: direct laryngoscopy       DL Blade Type: Villarreal 2       Grade View of Cords: 1       Position: Left       Measured from: lips       Secured at (cm): 21       Bite block used: None    Post intubation assessment        Placement verified by: capnometry and equal breath sounds        Number of attempts at approach: 1       Number of other approaches attempted: 0       Secured with: plastic tape       Ease of procedure: easy       Dentition: Intact and Unchanged    Medication(s) Administered   Medication Administration Time: 7/14/2023 2:28 PM

## 2023-07-14 NOTE — ANESTHESIA POSTPROCEDURE EVALUATION
Patient: Makayla Delgado    Procedure: Procedure(s):  Robot Assisted Ion BRONCHOSCOPY, WITH BIOPSY  endobronchial ultrasound with transbronchial biopsies       Anesthesia Type:  General    Note:  Disposition: Outpatient   Postop Pain Control: Uneventful            Sign Out: Well controlled pain   PONV: No   Neuro/Psych: Uneventful            Sign Out: Acceptable/Baseline neuro status   Airway/Respiratory: Uneventful            Sign Out: Acceptable/Baseline resp. status   CV/Hemodynamics: Uneventful            Sign Out: Acceptable CV status; No obvious hypovolemia; No obvious fluid overload   Other NRE: NONE   DID A NON-ROUTINE EVENT OCCUR?            Last vitals:  Vitals Value Taken Time   /57 07/14/23 1615   Temp 35.9  C (96.6  F) 07/14/23 1600   Pulse 57 07/14/23 1623   Resp 12 07/14/23 1623   SpO2 93 % 07/14/23 1623   Vitals shown include unvalidated device data.    Electronically Signed By: Hal Baker MD  July 14, 2023  4:24 PM

## 2023-07-14 NOTE — PROCEDURES
INTERVENTIONAL PULMONOLOGY       Procedure(s):    A flexible bronchoscopy  Airway exam  BAL  EBUS-TBNA (2 sites)  Therapeutic suctioning (1 site)  Transbronchial fine needle aspiration (1 site)  ION Robotic Bronchoscopy     Indication:  Left lower lung and right lower lobe lesions    Attending of Record:  Kar Soriano MD    Interventional Pulmonary Fellow   Anne-Marie Lopez    Trainees Present:   None     Medications:    General Anesthesia - See anesthesia flowsheet for details    Sedation Time:   Per Anesthesia Care Provider    Time Out:  Performed    The patient's medical record has been reviewed.  The indication for the procedure was reviewed.  The necessary history and physical examination was performed and reviewed.  The risks, benefits and alternatives of the procedure were discussed with the the patient in detail and she had the opportunity to ask questions.  I discussed in particular the potential complications including risks of minor or life-threatening bleeding and/or infection, respiratory failure, vocal cord trauma / paralysis, pneumothorax, and discomfort. Sedation risks were also discussed including abnormal heart rhythms, low blood pressure, and respiratory failure. All questions were answered to the best of my ability.  Verbal and written informed consent was obtained.  The proposed procedure and the patient's identification were verified prior to the procedure by the physician and the nurse.    The patient was assessed for the adequacy for the procedure and to receive medications.   Mental Status:  Alert and oriented x 3  Airway examination:  Class I (Complete visualization of soft palate)  Pulmonary:  Non labored respirations  CV:  Regular rate  ASA Grade:  (II)  Mild systemic disease    After clinical evaluation and reviewing the indication, risks, alternatives and benefits of the procedure the patient was deemed to be in satisfactory condition to undergo the procedure.      Immediately  before administration of medications the patient was re-assessed for adequacy to receive sedatives including the heart rate, respiratory rate, mental status, oxygen saturation, blood pressure and adequacy of pulmonary ventilation. These same parameters were continuously monitored throughout the procedure.    A Tuberculosis risk assessment was performed:  The patient has no known RISK of Tuberculosis    The procedure was performed in a negative airflow room: Yes    Maneuvers / Procedure:      A Flexible  bronchoscope was used for the procedure. The patient was orally intubated with a # 8.5 ETT and the flexible scope was passed through.    BAL: The bronchoscope was wedged into the RLL bronchus and small volume BAL was performed.     EBUS-TBNA (LN): The EBUS scope was inserted and evaluation included: Stations 11R, 10R, 4 R, 7, 4L, 10L, 11L were examined, only stations 7 and 11L were large enough for biopsy. Using 23 G EBUS needle EBUS TBNA of 7 and 11 L stations performed.     ION Robotic Bronchoscopy: Planning was performed on the laptop prior to the procedure. The ION successfully completed its pre-procedural check phase. The ION arm was oriented towards the ETT and docked successfully. The robotic catheter was inserted into the ETT and the guide was clamped down. Registration was then completed successfully. The catheter was advanced towards the LLL Superior lesion/nodule using the vision probe. Local confirmation of the lesion utilized with ENB-Targeting and Radial EBUS.. The lesion was biopsied using 23 G FNA needle. A total of 6 passes were performed. ADRIENNE was present throughout the procedure. EBL was minimal. After LLL biopsy attepmt was made to biopsy RLL lesion but unable to navigate to RLL lesion with acceptable rEBUS view. Guide catheter and vision probe was removed successfully. The ION platform was undocked without issues.       Any disposable equipment was visually inspected and deemed to be intact  immediately post procedure.      Relevant Pictures    LLL lesion rEBUS view        Assessment and Recommendations:     1. Successful completion of ION robotic bronchoscopy with LLL FNA, EBUS TBNA stations 7 and 11L  2. Ok to discharge once medically stable.   3. Follow up with IP service          Anne-Marie Lopez  Interventional pulmonary fellow  Pager: [phone]

## 2023-07-18 LAB
PATH REPORT.COMMENTS IMP SPEC: NORMAL
PATH REPORT.FINAL DX SPEC: NORMAL
PATH REPORT.FINAL DX SPEC: NORMAL
PATH REPORT.GROSS SPEC: NORMAL
PATH REPORT.GROSS SPEC: NORMAL
PATH REPORT.MICROSCOPIC SPEC OTHER STN: NORMAL
PATH REPORT.MICROSCOPIC SPEC OTHER STN: NORMAL

## 2023-08-20 ENCOUNTER — HEALTH MAINTENANCE LETTER (OUTPATIENT)
Age: 78
End: 2023-08-20

## 2024-10-13 ENCOUNTER — HEALTH MAINTENANCE LETTER (OUTPATIENT)
Age: 79
End: 2024-10-13

## 2025-08-14 ENCOUNTER — LAB REQUISITION (OUTPATIENT)
Dept: LAB | Facility: CLINIC | Age: 80
End: 2025-08-14
Payer: COMMERCIAL

## (undated) DEVICE — TUBING SUCTION 10'X3/16" N510

## (undated) DEVICE — ENDO VALVE SUCTION ULTRASOUND BRONCH MAJ-1414

## (undated) DEVICE — SYR 30ML SLIP TIP W/O NDL 302833

## (undated) DEVICE — BAG INSTRUMENT IV VISION ION 490127

## (undated) DEVICE — ENDO ADPT BRONCH SWIVEL PORTEX UNSTERILE

## (undated) DEVICE — NDL BIOPSY 23G FLEXISION ION 490102

## (undated) DEVICE — LUBRICANT INST KIT ENDO-LUBE 220-90

## (undated) DEVICE — SUCTION MANIFOLD NEPTUNE 2 SYS 4 PORT 0702-020-000

## (undated) DEVICE — ENDO VALVE SUCTION BRONCH EVIS MAJ-209

## (undated) DEVICE — KIT ENDO FIRST STEP DISINFECTANT 200ML W/POUCH EP-4

## (undated) DEVICE — SOL NACL 0.9% IRRIG 1000ML BOTTLE 2F7124

## (undated) DEVICE — ENDO ADPT BRONCH SWIVEL Y A1002

## (undated) DEVICE — ENDO VALVE SYR NDL KIT ULTRASOUND BRONCH NA-201SX-4022-A

## (undated) DEVICE — PITCHER STERILE 1000ML  SSK9004A

## (undated) DEVICE — CONNECTOR SWIVEL 7.0MM ION 490108

## (undated) DEVICE — ENDO VALVE BX EVIS MAJ-210

## (undated) DEVICE — ADAPTER PROBE/SUCTION VISION ION 490101

## (undated) DEVICE — NDL ASPIRATION EBUS-VIZISHOT 22G NA-U401SX-4022-A

## (undated) RX ORDER — FENTANYL CITRATE 50 UG/ML
INJECTION, SOLUTION INTRAMUSCULAR; INTRAVENOUS
Status: DISPENSED
Start: 2023-07-14